# Patient Record
Sex: MALE | Employment: UNEMPLOYED | URBAN - METROPOLITAN AREA
[De-identification: names, ages, dates, MRNs, and addresses within clinical notes are randomized per-mention and may not be internally consistent; named-entity substitution may affect disease eponyms.]

---

## 2020-06-26 ENCOUNTER — HOSPITAL ENCOUNTER (EMERGENCY)
Facility: CLINIC | Age: 30
Discharge: HOME | End: 2020-06-26
Attending: EMERGENCY MEDICINE | Admitting: EMERGENCY MEDICINE

## 2020-06-26 VITALS
TEMPERATURE: 99 F | HEART RATE: 135 BPM | DIASTOLIC BLOOD PRESSURE: 86 MMHG | OXYGEN SATURATION: 96 % | RESPIRATION RATE: 18 BRPM | SYSTOLIC BLOOD PRESSURE: 137 MMHG

## 2020-06-26 DIAGNOSIS — F10.920 ALCOHOLIC INTOXICATION WITHOUT COMPLICATION (H): ICD-10-CM

## 2020-06-26 PROCEDURE — 99283 EMERGENCY DEPT VISIT LOW MDM: CPT

## 2020-06-26 NOTE — ED PROVIDER NOTES
Visit Date:   06/26/2020      CHIEF COMPLAINT:  Alcohol intoxication.      HISTORY OF PRESENT ILLNESS:  This is a pleasant 30-year-old gentleman who presents by EMS.  He was at a bar and had a blood alcohol test of 0.17.  It sounds like the police at the scene were concerned he was going to attempt to drive home and placed him on transport hold.  He presented here without any other complaints.  He has been cooperative for EMS.      PAST MEDICAL HISTORY:  None.      PAST SURGICAL HISTORY:  None.      MEDICATIONS:  None.      ALLERGIES:  NONE.      SOCIAL HISTORY:  The patient is here by himself.  He does not smoke.      REVIEW OF SYSTEMS:  Pertinent 10-point review of systems is negative, except for that noted in the HPI.      PHYSICAL EXAMINATION:   GENERAL:  The patient is sitting up comfortably in bed, cooperative.   VITAL SIGNS:  Blood pressure 136/99, heart rate 96, respiratory rate 18, oxygen 96% on room air, temperature 99.   HEENT:  Moist mucous membranes.   CARDIOVASCULAR:  Regular rhythm, normal rate.  No murmurs.   RESPIRATORY:  Clear to auscultation bilaterally without wheezes or increased work of breathing.   GASTROINTESTINAL:  Soft, normal appearance, nontender.   MUSCULOSKELETAL:  No deformity.   SKIN:  No pertinent skin findings.   PSYCHIATRIC:  The patient has normal mood and affect.  No psychosis.   NEUROLOGIC:  The patient is alert and oriented x 4 and has normal strength.      LABORATORY EVALUATION AND DIAGNOSTIC STUDIES:  None.      EMERGENCY DEPARTMENT COURSE AND MEDICAL DECISION MAKING:  This is a 30-year-old gentleman who presents on a safety hold.  No concern for toxicologic ingestion other than the aforementioned alcohol.  He is protecting his airway and had a breath alcohol test of 0.17.  He has been able to reach a family member who is coming to pick him up and will be discharged home at that time.      DIAGNOSES:   Alcohol intoxication.      PLAN OF CARE:  As above.         LESLEE GARCIA  MD             D: 2020   T: 2020   MT: CHARBEL      Name:     KANE WATERS   MRN:      [id number]        Account:      [id number]   :      1990           Visit Date:   2020      Document: [id number]

## 2020-06-26 NOTE — ED TRIAGE NOTES
Pt presents to ED via EMS after a bystander called 911 due to thinking pt would drive home.  Pt blew 0.17, did not have a ride home. Pt calm and cooperative

## 2020-06-26 NOTE — ED AVS SNAPSHOT
[facility] Emergency Department  [address]  [facility] [address]  Phone:  [phone]  Fax:  [phone]                                    Christopher Trammell   MRN: [id number]    Department:  [facility] Emergency Department   Date of Visit:  6/26/2020           After Visit Summary Signature Page    I have received my discharge instructions, and my questions have been answered. I have discussed any challenges I see with this plan with the nurse or doctor.    ..........................................................................................................................................  Patient/Patient Representative Signature      ..........................................................................................................................................  Patient Representative Print Name and Relationship to Patient    ..................................................               ................................................  Date                                   Time    ..........................................................................................................................................  Reviewed by Signature/Title    ...................................................              ..............................................  Date                                               Time          22EPIC Rev 08/18

## 2020-11-22 ENCOUNTER — HEALTH MAINTENANCE LETTER (OUTPATIENT)
Age: 30
End: 2020-11-22

## 2021-09-19 ENCOUNTER — HEALTH MAINTENANCE LETTER (OUTPATIENT)
Age: 31
End: 2021-09-19

## 2022-01-08 ENCOUNTER — HEALTH MAINTENANCE LETTER (OUTPATIENT)
Age: 32
End: 2022-01-08

## 2022-11-20 ENCOUNTER — HEALTH MAINTENANCE LETTER (OUTPATIENT)
Age: 32
End: 2022-11-20

## 2023-04-15 ENCOUNTER — HEALTH MAINTENANCE LETTER (OUTPATIENT)
Age: 33
End: 2023-04-15